# Patient Record
Sex: FEMALE | Race: WHITE | NOT HISPANIC OR LATINO | Employment: FULL TIME | URBAN - NONMETROPOLITAN AREA
[De-identification: names, ages, dates, MRNs, and addresses within clinical notes are randomized per-mention and may not be internally consistent; named-entity substitution may affect disease eponyms.]

---

## 2020-11-20 ENCOUNTER — OFFICE VISIT (OUTPATIENT)
Dept: OBSTETRICS AND GYNECOLOGY | Facility: CLINIC | Age: 40
End: 2020-11-20
Payer: COMMERCIAL

## 2020-11-20 VITALS — HEART RATE: 93 BPM | DIASTOLIC BLOOD PRESSURE: 94 MMHG | SYSTOLIC BLOOD PRESSURE: 142 MMHG | WEIGHT: 186 LBS

## 2020-11-20 DIAGNOSIS — Z12.4 SCREENING FOR MALIGNANT NEOPLASM OF THE CERVIX: ICD-10-CM

## 2020-11-20 DIAGNOSIS — Z30.41 SURVEILLANCE OF CONTRACEPTIVE PILL: ICD-10-CM

## 2020-11-20 DIAGNOSIS — Z01.419 ROUTINE GYNECOLOGICAL EXAMINATION: Primary | ICD-10-CM

## 2020-11-20 PROCEDURE — 99385 PR PREVENTIVE VISIT,NEW,18-39: ICD-10-PCS | Mod: S$GLB,,, | Performed by: OBSTETRICS & GYNECOLOGY

## 2020-11-20 PROCEDURE — 99999 PR PBB SHADOW E&M-NEW PATIENT-LVL II: ICD-10-PCS | Mod: PBBFAC,,, | Performed by: OBSTETRICS & GYNECOLOGY

## 2020-11-20 PROCEDURE — 99385 PREV VISIT NEW AGE 18-39: CPT | Mod: S$GLB,,, | Performed by: OBSTETRICS & GYNECOLOGY

## 2020-11-20 PROCEDURE — 99999 PR PBB SHADOW E&M-NEW PATIENT-LVL II: CPT | Mod: PBBFAC,,, | Performed by: OBSTETRICS & GYNECOLOGY

## 2020-11-20 RX ORDER — NORGESTIMATE AND ETHINYL ESTRADIOL 7DAYSX3 28
1 KIT ORAL DAILY
Qty: 28 TABLET | Refills: 12 | Status: SHIPPED | OUTPATIENT
Start: 2020-11-20 | End: 2021-02-11 | Stop reason: SDUPTHER

## 2020-11-20 RX ORDER — NORGESTIMATE AND ETHINYL ESTRADIOL 7DAYSX3 28
KIT ORAL
COMMUNITY
Start: 2020-11-02 | End: 2020-11-20 | Stop reason: SDUPTHER

## 2020-11-20 NOTE — PROGRESS NOTES
SUBJECTIVE:   39 y.o. female for annual routine Pap and checkup.  Current Outpatient Medications   Medication Sig Dispense Refill    TRI-SPRINTEC, 28, 0.18/0.215/0.25 mg-35 mcg (28) tablet Take 1 tablet by mouth once daily. 28 tablet 12     No current facility-administered medications for this visit.      Allergies: Patient has no known allergies.   Patient's last menstrual period was 11/04/2020.    ROS:  Feeling well. No dyspnea or chest pain on exertion.  No abdominal pain, change in bowel habits, black or bloody stools.  No urinary tract symptoms. GYN ROS: normal menses, no abnormal bleeding, pelvic pain or discharge, no breast pain or new or enlarging lumps on self exam. No neurological complaints.    OBJECTIVE:   The patient appears well, alert, oriented x 3, in no distress.  BP (!) 142/94   Pulse 93   Wt 84.4 kg (186 lb)   LMP 11/04/2020   ENT normal.  Neck supple. No adenopathy or thyromegaly. MANJINDER. Lungs are clear, good air entry, no wheezes, rhonchi or rales. S1 and S2 normal, no murmurs, regular rate and rhythm. Abdomen soft without tenderness, guarding, mass or organomegaly. Extremities show no edema, normal peripheral pulses. Neurological is normal, no focal findings.    BREAST EXAM: breasts appear normal, no suspicious masses, no skin or nipple changes or axillary nodes    PELVIC EXAM: VULVA: normal appearing vulva with no masses, tenderness or lesions, VAGINA: normal appearing vagina with normal color and discharge, no lesions, CERVIX: normal appearing cervix without discharge or lesions, UTERUS: uterus is normal size, shape, consistency and nontender, ADNEXA: normal adnexa in size, nontender and no masses    ASSESSMENT:   well woman  no contraindication to continue use of oral contraceptives    PLAN:   pap smear  return annually or prn

## 2020-11-26 LAB
HPV16+18+H RISK 12 DNA CVX-IMP: NORMAL
LIQUID BASED PAP SMEAR, SCREENING: NORMAL

## 2021-02-11 RX ORDER — NORGESTIMATE AND ETHINYL ESTRADIOL 7DAYSX3 28
1 KIT ORAL DAILY
Qty: 28 TABLET | Refills: 12 | Status: SHIPPED | OUTPATIENT
Start: 2021-02-11 | End: 2021-11-29 | Stop reason: SDUPTHER

## 2021-11-29 RX ORDER — NORGESTIMATE AND ETHINYL ESTRADIOL 7DAYSX3 28
1 KIT ORAL DAILY
Qty: 28 TABLET | Refills: 12 | Status: SHIPPED | OUTPATIENT
Start: 2021-11-29 | End: 2022-01-04 | Stop reason: SDUPTHER

## 2022-01-04 ENCOUNTER — OFFICE VISIT (OUTPATIENT)
Dept: OBSTETRICS AND GYNECOLOGY | Facility: CLINIC | Age: 42
End: 2022-01-04
Payer: COMMERCIAL

## 2022-01-04 VITALS
HEIGHT: 72 IN | HEART RATE: 91 BPM | SYSTOLIC BLOOD PRESSURE: 143 MMHG | DIASTOLIC BLOOD PRESSURE: 96 MMHG | BODY MASS INDEX: 24.52 KG/M2 | WEIGHT: 181 LBS

## 2022-01-04 DIAGNOSIS — Z12.4 SCREENING FOR MALIGNANT NEOPLASM OF THE CERVIX: Primary | ICD-10-CM

## 2022-01-04 DIAGNOSIS — Z01.419 ROUTINE GYNECOLOGICAL EXAMINATION: ICD-10-CM

## 2022-01-04 DIAGNOSIS — Z30.41 SURVEILLANCE OF CONTRACEPTIVE PILL: ICD-10-CM

## 2022-01-04 PROCEDURE — 99395 PREV VISIT EST AGE 18-39: CPT | Mod: S$GLB,,, | Performed by: OBSTETRICS & GYNECOLOGY

## 2022-01-04 PROCEDURE — 99999 PR PBB SHADOW E&M-EST. PATIENT-LVL II: CPT | Mod: PBBFAC,,, | Performed by: OBSTETRICS & GYNECOLOGY

## 2022-01-04 PROCEDURE — 99395 PR PREVENTIVE VISIT,EST,18-39: ICD-10-PCS | Mod: S$GLB,,, | Performed by: OBSTETRICS & GYNECOLOGY

## 2022-01-04 PROCEDURE — 99999 PR PBB SHADOW E&M-EST. PATIENT-LVL II: ICD-10-PCS | Mod: PBBFAC,,, | Performed by: OBSTETRICS & GYNECOLOGY

## 2022-01-04 RX ORDER — NORGESTIMATE AND ETHINYL ESTRADIOL 7DAYSX3 28
1 KIT ORAL DAILY
Qty: 28 TABLET | Refills: 12 | Status: SHIPPED | OUTPATIENT
Start: 2022-01-04 | End: 2023-01-20 | Stop reason: SDUPTHER

## 2022-01-04 NOTE — PATIENT INSTRUCTIONS
Patient Education       Lowering Your Risk of Breast Cancer   About this topic   Breast cancer is a serious illness. Breast cancer is when abnormal cells grow and divide more quickly in your breast. These cells form a growth or tumor. The abnormal cells may enter nearby tissue and spread to other parts of the body. It is the type of cancer most often seen in women. Men can have breast cancer, but it is a rare condition.  General   Some things in your life may increase your risk of breast cancer. You may not be able to change some of these. Others you can control.  You are more likely to get breast cancer if you:  · Have a mother, sister, or daughter who has had breast cancer  · Have used hormones for menopause for more than 5 years  · Have had radiation therapy to the breast or chest in the past  · Are overweight or do not exercise  · Had your first menstrual period before you were 13 years old  · Went through menopause after age 57  · Have never been pregnant or had your first child after age 35  · Have had breast cancer before  · Drink alcohol in any form  · Have dense breasts  · Are older in age  There is no certain way to prevent breast cancer. There are things you can do to lower your chances of having breast cancer.  · Keep a healthy weight. Lose weight if you are overweight. Being overweight raises your chances of having breast cancer.  · Eat a healthy diet to maintain a healthy weight, such as more fruits, vegetables, and lean cuts of meat. Decrease the amount of saturated fat in your diet.  · Exercise. Being active helps you keep a healthy weight.  · Limit your alcohol intake or do not drink alcohol. The more alcohol you drink, the higher your risk.  · Do not smoke cigarettes. Smoking can increase your risk of many types of cancer.  · Breastfeed your baby. This may help protect you. The longer you breastfeed, the more protection you have.  · Talk with your doctor about:  ? Limiting or stopping hormone  therapy.  ? Taking certain drugs to prevent breast cancer. For women at high risk of having breast cancer, there are a few drugs that may lower your risk.  ? Surgery to prevent you from having breast cancer if you are very high risk.  When do I need to call the doctor?   · Changes in your breasts  · A lump or area in your breast that feels different  · Discharge from your nipple  · Skin on your breast is dimpled or indented  · You have questions or concerns about your breasts  Helpful tips   · Talk to your doctor about the best kind of breast cancer screening for you.  · If you want to do self breast exams, have your doctor show you the right way to do them.  · Tell your doctor of any abnormal finding.  Where can I learn more?   [employer] [place]  http://www.cancer.gov/types/breast/patient/breast-prevention-pdq   [facility] Choices  http://www.nhs.uk/Conditions/Cancer-of-the-breast-female/Pages/prevention.aspx   [facility]  http://ww5.[employer].org/BreastCancer/LowerYourRisk.html#RiskFactors   Last Reviewed Date   2021-10-04  Consumer Information Use and Disclaimer   This information is not specific medical advice and does not replace information you receive from your health care provider. This is only a brief summary of general information. It does NOT include all information about conditions, illnesses, injuries, tests, procedures, treatments, therapies, discharge instructions or life-style choices that may apply to you. You must talk with your health care provider for complete information about your health and treatment options. This information should not be used to decide whether or not to accept your health care providers advice, instructions or recommendations. Only your health care provider has the knowledge and training to provide advice that is right for you.  Copyright   Copyright © 2021 [employer] and its affiliates and/or licensors. All rights reserved.

## 2022-01-04 NOTE — PROGRESS NOTES
"SUBJECTIVE:   41 y.o. female for annual routine Pap and checkup.  Current Outpatient Medications   Medication Sig Dispense Refill    TRI-SPRINTEC, 28, 0.18/0.215/0.25 mg-35 mcg (28) tablet Take 1 tablet by mouth once daily. 28 tablet 12     No current facility-administered medications for this visit.     Allergies: Patient has no known allergies.   No LMP recorded.    ROS:  Feeling well. No dyspnea or chest pain on exertion.  No abdominal pain, change in bowel habits, black or bloody stools.  No urinary tract symptoms. GYN ROS: normal menses, no abnormal bleeding, pelvic pain or discharge, no breast pain or new or enlarging lumps on self exam. No neurological complaints.    OBJECTIVE:   The patient appears well, alert, oriented x 3, in no distress.  BP (!) 143/96   Pulse 91   Ht 6' 1" (1.854 m)   Wt 82.1 kg (181 lb)   BMI 23.88 kg/m²   ENT normal.  Neck supple. No adenopathy or thyromegaly. MANJINDER. Lungs are clear, good air entry, no wheezes, rhonchi or rales. S1 and S2 normal, no murmurs, regular rate and rhythm. Abdomen soft without tenderness, guarding, mass or organomegaly. Extremities show no edema, normal peripheral pulses. Neurological is normal, no focal findings.    BREAST EXAM: breasts appear normal, no suspicious masses, no skin or nipple changes or axillary nodes    PELVIC EXAM: VULVA: normal appearing vulva with no masses, tenderness or lesions, VAGINA: normal appearing vagina with normal color and discharge, no lesions, CERVIX: normal appearing cervix without discharge or lesions, UTERUS: uterus is normal size, shape, consistency and nontender, ADNEXA: normal adnexa in size, nontender and no masses    ASSESSMENT:   well woman  no contraindication to continue use of oral contraceptives    PLAN:   pap smear  counseled on breast self exam  return annually or prn  rx tri-sprintec  "

## 2022-01-07 LAB
HPV16+18+H RISK 12 DNA CVX-IMP: NORMAL
LIQUID BASED PAP SMEAR, SCREENING: NORMAL

## 2023-01-20 ENCOUNTER — OFFICE VISIT (OUTPATIENT)
Dept: OBSTETRICS AND GYNECOLOGY | Facility: CLINIC | Age: 43
End: 2023-01-20
Payer: COMMERCIAL

## 2023-01-20 VITALS
DIASTOLIC BLOOD PRESSURE: 87 MMHG | WEIGHT: 180 LBS | HEART RATE: 96 BPM | HEIGHT: 72 IN | BODY MASS INDEX: 24.38 KG/M2 | SYSTOLIC BLOOD PRESSURE: 141 MMHG

## 2023-01-20 DIAGNOSIS — Z12.4 SCREENING FOR MALIGNANT NEOPLASM OF THE CERVIX: Primary | ICD-10-CM

## 2023-01-20 DIAGNOSIS — Z01.419 ROUTINE GYNECOLOGICAL EXAMINATION: ICD-10-CM

## 2023-01-20 DIAGNOSIS — Z12.31 SCREENING MAMMOGRAM, ENCOUNTER FOR: ICD-10-CM

## 2023-01-20 DIAGNOSIS — Z30.41 SURVEILLANCE OF CONTRACEPTIVE PILL: ICD-10-CM

## 2023-01-20 DIAGNOSIS — R23.2 HOT FLASHES: ICD-10-CM

## 2023-01-20 PROCEDURE — 99999 PR PBB SHADOW E&M-EST. PATIENT-LVL III: ICD-10-PCS | Mod: PBBFAC,,, | Performed by: OBSTETRICS & GYNECOLOGY

## 2023-01-20 PROCEDURE — 99999 PR PBB SHADOW E&M-EST. PATIENT-LVL III: CPT | Mod: PBBFAC,,, | Performed by: OBSTETRICS & GYNECOLOGY

## 2023-01-20 PROCEDURE — 99396 PR PREVENTIVE VISIT,EST,40-64: ICD-10-PCS | Mod: S$GLB,,, | Performed by: OBSTETRICS & GYNECOLOGY

## 2023-01-20 PROCEDURE — 99396 PREV VISIT EST AGE 40-64: CPT | Mod: S$GLB,,, | Performed by: OBSTETRICS & GYNECOLOGY

## 2023-01-20 RX ORDER — NORGESTIMATE AND ETHINYL ESTRADIOL 7DAYSX3 28
1 KIT ORAL DAILY
Qty: 28 TABLET | Refills: 12 | Status: SHIPPED | OUTPATIENT
Start: 2023-01-20 | End: 2023-01-26

## 2023-01-20 NOTE — PROGRESS NOTES
"SUBJECTIVE:   42 y.o. female for annual routine Pap and checkup.  Current Outpatient Medications   Medication Sig Dispense Refill    TRI-SPRINTEC, 28, 0.18/0.215/0.25 mg-35 mcg (28) tablet Take 1 tablet by mouth once daily. 28 tablet 12     No current facility-administered medications for this visit.     Allergies: Patient has no known allergies.   Patient's last menstrual period was 12/20/2022 (within days).    ROS:  Feeling well. No dyspnea or chest pain on exertion.  No abdominal pain, change in bowel habits, black or bloody stools.  No urinary tract symptoms. GYN ROS: normal menses, no abnormal bleeding, pelvic pain or discharge, no breast pain or new or enlarging lumps on self exam, she complains of occasional hot flashes. No neurological complaints.    OBJECTIVE:   The patient appears well, alert, oriented x 3, in no distress.  BP (!) 141/87   Pulse 96   Ht 6' 1" (1.854 m)   Wt 81.6 kg (180 lb)   LMP 12/20/2022 (Within Days)   BMI 23.75 kg/m²   ENT normal.  Neck supple. No adenopathy or thyromegaly. MANJINDER. Lungs are clear, good air entry, no wheezes, rhonchi or rales. S1 and S2 normal, no murmurs, regular rate and rhythm. Abdomen soft without tenderness, guarding, mass or organomegaly. Extremities show no edema, normal peripheral pulses. Neurological is normal, no focal findings.    BREAST EXAM: breasts appear normal, no suspicious masses, no skin or nipple changes or axillary nodes    PELVIC EXAM: VULVA: normal appearing vulva with no masses, tenderness or lesions, VAGINA: normal appearing vagina with normal color and discharge, no lesions, CERVIX: normal appearing cervix without discharge or lesions, UTERUS: uterus is normal size, shape, consistency and nontender, ADNEXA: normal adnexa in size, nontender and no masses, PAP: Pap smear done today, thin-prep method    ASSESSMENT:   well woman  no contraindication to continue use of oral contraceptives    PLAN:   mammogram  pap smear  return annually or " prn

## 2023-01-31 ENCOUNTER — HOSPITAL ENCOUNTER (OUTPATIENT)
Dept: RADIOLOGY | Facility: HOSPITAL | Age: 43
Discharge: HOME | End: 2023-01-31
Attending: OBSTETRICS & GYNECOLOGY
Payer: COMMERCIAL

## 2023-01-31 VITALS — BODY MASS INDEX: 24.38 KG/M2 | WEIGHT: 180 LBS | HEIGHT: 72 IN

## 2023-01-31 DIAGNOSIS — Z12.31 SCREENING MAMMOGRAM, ENCOUNTER FOR: ICD-10-CM

## 2023-01-31 PROCEDURE — 77067 SCR MAMMO BI INCL CAD: CPT | Mod: TC

## 2023-02-02 LAB
HPV16+18+H RISK 12 DNA CVX-IMP: NORMAL
LIQUID BASED PAP SMEAR, SCREENING: NORMAL

## 2023-12-14 ENCOUNTER — TELEPHONE (OUTPATIENT)
Dept: OBSTETRICS AND GYNECOLOGY | Facility: CLINIC | Age: 43
End: 2023-12-14
Payer: COMMERCIAL

## 2024-02-09 ENCOUNTER — OFFICE VISIT (OUTPATIENT)
Dept: OBSTETRICS AND GYNECOLOGY | Facility: CLINIC | Age: 44
End: 2024-02-09
Payer: COMMERCIAL

## 2024-02-09 VITALS — DIASTOLIC BLOOD PRESSURE: 78 MMHG | BODY MASS INDEX: 23.22 KG/M2 | WEIGHT: 176 LBS | SYSTOLIC BLOOD PRESSURE: 146 MMHG

## 2024-02-09 DIAGNOSIS — Z12.4 SCREENING FOR MALIGNANT NEOPLASM OF THE CERVIX: ICD-10-CM

## 2024-02-09 DIAGNOSIS — Z01.419 ROUTINE GYNECOLOGICAL EXAMINATION: Primary | ICD-10-CM

## 2024-02-09 DIAGNOSIS — Z00.00 ROUTINE GENERAL MEDICAL EXAMINATION AT A HEALTH CARE FACILITY: ICD-10-CM

## 2024-02-09 DIAGNOSIS — Z30.41 SURVEILLANCE OF CONTRACEPTIVE PILL: ICD-10-CM

## 2024-02-09 DIAGNOSIS — R23.2 HOT FLASHES: ICD-10-CM

## 2024-02-09 DIAGNOSIS — Z12.31 SCREENING MAMMOGRAM, ENCOUNTER FOR: ICD-10-CM

## 2024-02-09 PROCEDURE — 99396 PREV VISIT EST AGE 40-64: CPT | Mod: S$GLB,,, | Performed by: OBSTETRICS & GYNECOLOGY

## 2024-02-09 PROCEDURE — 99999 PR PBB SHADOW E&M-EST. PATIENT-LVL III: CPT | Mod: PBBFAC,,, | Performed by: OBSTETRICS & GYNECOLOGY

## 2024-02-09 RX ORDER — NORGESTIMATE AND ETHINYL ESTRADIOL 7DAYSX3 28
1 KIT ORAL DAILY
Qty: 28 TABLET | Refills: 12 | Status: SHIPPED | OUTPATIENT
Start: 2024-02-09

## 2024-02-09 NOTE — PROGRESS NOTES
SUBJECTIVE:   43 y.o. female for annual routine Pap and checkup.  Had one episode of heavy bleeding in December.   Current Outpatient Medications   Medication Sig Dispense Refill    TRI-SPRINTEC, 28, 0.18/0.215/0.25 mg-35 mcg (28) tablet TAKE 1 TABLET BY MOUTH EVERY DAY 28 tablet 12     No current facility-administered medications for this visit.     Allergies: Patient has no known allergies.   No LMP recorded.    ROS:  Feeling well. No dyspnea or chest pain on exertion.  No abdominal pain, change in bowel habits, black or bloody stools.  No urinary tract symptoms. GYN ROS: normal menses, no abnormal bleeding, pelvic pain or discharge, no breast pain or new or enlarging lumps on self exam. No neurological complaints.    OBJECTIVE:   The patient appears well, alert, oriented x 3, in no distress.  BP (!) 146/78   Wt 79.8 kg (176 lb)   BMI 23.22 kg/m²   ENT normal.  Neck supple. No adenopathy or thyromegaly. MANJINDER. Lungs are clear, good air entry, no wheezes, rhonchi or rales. S1 and S2 normal, no murmurs, regular rate and rhythm. Abdomen soft without tenderness, guarding, mass or organomegaly. Extremities show no edema, normal peripheral pulses. Neurological is normal, no focal findings.    BREAST EXAM: breasts appear normal, no suspicious masses, no skin or nipple changes or axillary nodes    PELVIC EXAM: VULVA: normal appearing vulva with no masses, tenderness or lesions, VAGINA: normal appearing vagina with normal color and discharge, no lesions, CERVIX: normal appearing cervix without discharge or lesions, UTERUS: uterus is normal size, shape, consistency and nontender, ADNEXA: normal adnexa in size, nontender and no masses, PAP: Pap smear done today, thin-prep method    ASSESSMENT:   well woman  no contraindication to continue use of oral contraceptives    PLAN:   mammogram  pap smear  return annually or prn

## 2024-02-21 LAB
HPV16+18+H RISK 12 DNA CVX-IMP: NORMAL
LIQUID BASED PAP SMEAR, SCREENING: NORMAL

## 2024-03-15 ENCOUNTER — HOSPITAL ENCOUNTER (OUTPATIENT)
Dept: RADIOLOGY | Facility: HOSPITAL | Age: 44
Discharge: HOME | End: 2024-03-15
Attending: OBSTETRICS & GYNECOLOGY
Payer: COMMERCIAL

## 2024-03-15 VITALS — HEIGHT: 72 IN | BODY MASS INDEX: 23.84 KG/M2 | WEIGHT: 176 LBS

## 2024-03-15 DIAGNOSIS — Z12.31 SCREENING MAMMOGRAM, ENCOUNTER FOR: ICD-10-CM

## 2024-03-15 PROCEDURE — 77067 SCR MAMMO BI INCL CAD: CPT | Mod: TC

## 2025-02-11 ENCOUNTER — OFFICE VISIT (OUTPATIENT)
Dept: OBSTETRICS AND GYNECOLOGY | Facility: CLINIC | Age: 45
End: 2025-02-11
Payer: COMMERCIAL

## 2025-02-11 VITALS
SYSTOLIC BLOOD PRESSURE: 142 MMHG | DIASTOLIC BLOOD PRESSURE: 80 MMHG | WEIGHT: 179 LBS | BODY MASS INDEX: 24.24 KG/M2 | HEIGHT: 72 IN | HEART RATE: 87 BPM

## 2025-02-11 DIAGNOSIS — Z01.419 WOMEN'S ANNUAL ROUTINE GYNECOLOGICAL EXAMINATION: Primary | ICD-10-CM

## 2025-02-11 DIAGNOSIS — Z12.31 ENCOUNTER FOR SCREENING MAMMOGRAM FOR BREAST CANCER: ICD-10-CM

## 2025-02-11 DIAGNOSIS — Z30.41 SURVEILLANCE OF CONTRACEPTIVE PILL: ICD-10-CM

## 2025-02-11 PROCEDURE — 99999 PR PBB SHADOW E&M-EST. PATIENT-LVL III: CPT | Mod: PBBFAC,,, | Performed by: OBSTETRICS & GYNECOLOGY

## 2025-02-11 PROCEDURE — 99396 PREV VISIT EST AGE 40-64: CPT | Mod: S$GLB,,, | Performed by: OBSTETRICS & GYNECOLOGY

## 2025-02-11 RX ORDER — NORGESTIMATE AND ETHINYL ESTRADIOL 7DAYSX3 28
1 KIT ORAL DAILY
Qty: 28 TABLET | Refills: 12 | Status: SHIPPED | OUTPATIENT
Start: 2025-02-11

## 2025-02-11 NOTE — PROGRESS NOTES
"SUBJECTIVE:   44 y.o. female for annual routine wellness checkup. No complaints today  Current Outpatient Medications   Medication Sig Dispense Refill    norgestimate-ethinyl estradioL (TRI-SPRINTEC, 28,) 0.18/0.215/0.25 mg-35 mcg (28) tablet Take 1 tablet by mouth once daily. 28 tablet 12     No current facility-administered medications for this visit.     Allergies: Patient has no known allergies.   Patient's last menstrual period was 01/22/2025 (exact date).    ROS:  Feeling well. No dyspnea or chest pain on exertion.  No abdominal pain, change in bowel habits, black or bloody stools.  No urinary tract symptoms. GYN ROS: normal menses, no abnormal bleeding, pelvic pain or discharge, no breast pain or new or enlarging lumps on self exam, no hot flashes. No neurological complaints.    OBJECTIVE:   The patient appears well, alert, oriented x 3, in no distress.  BP (!) 142/80   Pulse 87   Ht 6' 1" (1.854 m)   Wt 81.2 kg (179 lb)   LMP 01/22/2025 (Exact Date)   BMI 23.62 kg/m²   ENT normal.  Neck supple. No adenopathy or thyromegaly. MANJINDER. Lungs are clear, good air entry, no wheezes, rhonchi or rales. S1 and S2 normal, no murmurs, regular rate and rhythm. Abdomen soft without tenderness, guarding, mass or organomegaly. Extremities show no edema, normal peripheral pulses. Neurological is normal, no focal findings.    BREAST EXAM: breasts appear normal, no suspicious masses, no skin or nipple changes or axillary nodes    PELVIC EXAM: VULVA: normal appearing vulva with no masses, tenderness or lesions, VAGINA: normal appearing vagina with normal color and discharge, no lesions, CERVIX: normal appearing cervix without discharge or lesions, UTERUS: uterus is normal size, shape, consistency and nontender, ADNEXA: normal adnexa in size, nontender and no masses    ASSESSMENT:   well woman  no contraindication to continue use of oral contraceptives    PLAN:   mammogram  counseled on use and side effects of OCP's  return " annually or prn

## 2025-03-25 ENCOUNTER — HOSPITAL ENCOUNTER (OUTPATIENT)
Dept: RADIOLOGY | Facility: HOSPITAL | Age: 45
Discharge: HOME | End: 2025-03-25
Attending: OBSTETRICS & GYNECOLOGY
Payer: COMMERCIAL

## 2025-03-25 VITALS — HEIGHT: 72 IN | BODY MASS INDEX: 24.24 KG/M2 | WEIGHT: 179 LBS

## 2025-03-25 DIAGNOSIS — Z01.419 WOMEN'S ANNUAL ROUTINE GYNECOLOGICAL EXAMINATION: ICD-10-CM

## 2025-03-25 DIAGNOSIS — Z12.31 ENCOUNTER FOR SCREENING MAMMOGRAM FOR BREAST CANCER: ICD-10-CM

## 2025-03-25 PROCEDURE — 77067 SCR MAMMO BI INCL CAD: CPT | Mod: TC

## 2025-03-26 ENCOUNTER — RESULTS FOLLOW-UP (OUTPATIENT)
Dept: OBSTETRICS AND GYNECOLOGY | Facility: CLINIC | Age: 45
End: 2025-03-26